# Patient Record
Sex: MALE | Race: WHITE
[De-identification: names, ages, dates, MRNs, and addresses within clinical notes are randomized per-mention and may not be internally consistent; named-entity substitution may affect disease eponyms.]

---

## 2019-01-29 ENCOUNTER — HOSPITAL ENCOUNTER (INPATIENT)
Dept: HOSPITAL 93 - ER | Age: 59
LOS: 4 days | Discharge: HOME | DRG: 392 | End: 2019-02-02
Attending: INTERNAL MEDICINE | Admitting: INTERNAL MEDICINE
Payer: COMMERCIAL

## 2019-01-29 VITALS — BODY MASS INDEX: 28.25 KG/M2 | WEIGHT: 180 LBS | HEIGHT: 67 IN

## 2019-01-29 DIAGNOSIS — K57.20: Primary | ICD-10-CM

## 2019-01-29 DIAGNOSIS — R10.32: ICD-10-CM

## 2019-01-29 PROCEDURE — BW21Y0Z COMPUTERIZED TOMOGRAPHY (CT SCAN) OF ABDOMEN AND PELVIS USING OTHER CONTRAST, UNENHANCED AND ENHANCED: ICD-10-PCS | Performed by: GENERAL PRACTICE

## 2019-01-29 NOTE — NUR
SE RECIBE PTE ALERTA Y ORIENTADO X3 EL CUAL REFIERE VENIR POR DOLOR DE
FLANCO MARIA DEL CARMEN INTERMITENTE DESDE EL VIERNES. PTE REFIERE MORENITA ADVIL LE
BHARATH EL DOLOR. PTE NO REFIERE TENER NAUSEAS NI VOMITOS AL MOMENTO. SE MIDEN
S/V A PTE Y SE COLOCA EN LILLIAN DE ESPERA.

## 2019-01-29 NOTE — NUR
SE LE ADMINISTRA MEDICAMENTO DE ANTIBIOTICO Y SE LE ACOMODA EN CAMA CON
BARABDAS ELEVADA. PTE CONSULTADO A LA DRA SUE MUKHERJEE POR JAZMÍN LANDRY.SE
MANTIENE BAJO OBSERVACION POR CAMBIOS.

## 2019-01-29 NOTE — NUR
PACIENTE AL MOMENTO ESTABLE, ALERTA Y ORIENTADO X3, SE ADMINISTRAN MEDICAMENTOS
Y SE CONTINUA MONITOREANDO POR CAMBIOS.

## 2019-04-01 ENCOUNTER — HOSPITAL ENCOUNTER (OUTPATIENT)
Dept: HOSPITAL 93 - AMB-ENDOS | Age: 59
Discharge: HOME | End: 2019-04-01
Attending: COLON & RECTAL SURGERY
Payer: COMMERCIAL

## 2019-04-01 DIAGNOSIS — K57.20: Primary | ICD-10-CM

## 2019-04-01 DIAGNOSIS — K63.5: ICD-10-CM
